# Patient Record
(demographics unavailable — no encounter records)

---

## 2025-01-17 NOTE — END OF VISIT
[FreeTextEntry3] : I personally saw and examined WILFREDO DIAZ in detail.I have made changes in changes in the body of the note where appropriate. I personally reviewed the HPI, PMH, FH, SH, ROS and medications/allergies. I have spoken to BUBBA Bruno regarding the history and have personally determined the assessment and plan of care and documented this myself.. I performed all procedures and performed the physical exam. I have made changes in the body of the note where appropriate.   Attesting Faculty: See Attending Signature Below

## 2025-01-17 NOTE — PROCEDURE
[FreeTextEntry3] : Procedure - Cerumen Removal. Indication - Obstructing visualization of TM After informed verbal consent is obtained, cerumen was removed from the right ear canal(s) with suction.  Normal appearing canal following removal. Left mastoid cavity debrided with suction, curette and alligator forceps.  Clean mastoid bowl following debridement.  Mastoid powder placed.

## 2025-01-17 NOTE — HISTORY OF PRESENT ILLNESS
[de-identified] : 68 y/o M with Hx of Lt. Tympanomastoidectomy and later tympanoplasty.  Now with ear itching and sensation of crusting.  Notes he pulled a large bloody crust from the ear.  No pain, no Vertigo.   Hx of FESS, no congestion.  Uses nasal saline.  Avoids medicated nasal sprays.

## 2025-01-17 NOTE — ASSESSMENT
[FreeTextEntry1] : Lt. Mastoid bowl with discomfort: - Mastoid cavity debrided - CSF powder placed - F/U 3 Months  Right cerumen: - Removed.

## 2025-01-17 NOTE — PHYSICAL EXAM
[Normal] : mucosa is normal [Midline] : trachea located in midline position [de-identified] : Left mastoid with minimal cursting, mod mucoid secretions.  Can see area in the mastoid cavity where he likely removed a crust.  Right with mild cerumen.

## 2025-03-12 NOTE — ASSESSMENT
[FreeTextEntry1] : R shoulder calcific tendonitis.  Increased pain with lifting injury on 3/10/25.   Now with AC joint sprain and cuff tendinitis.  Xrays reviewed.  Rest, ice, activity modification.  R SA injection 2023 with relief.  Tylenol (he cant take NSAIDs due to Eliquis). Will do R AC joint injection today.  Tolerated well.  May consider R SA CSI if pain persists.  Start PT.  RTO in 6 weeks.    L AC arthrosis, impingement, possible prox biceps rupture. L SA injection 9/15/22, good but temporary relief. MRI reviewed - full thickness supra tear, labral tearing, biceps tendonitis Discussed op versus non op tx, including the r/b/a/c of both. Discussed rehab and recovery after SA, SAD, acromioplasty, synovectomy, GHD, RCR. He had cardiac stents this year and unsure he will be cleared for surgery. Discussed risk of further tearing/atrophy.   Procedure Note: Large Joint Injection was performed because of pain and inflammation, failure of conservative treatment.   Medications: Depo-Medrol: 1 cc, 80 mg. Lidocaine: 1 cc, 1%.  Marcaine: 1 cc, .25%.   Medication was injected in the right acromioclavicular joint. Patient has tried OTC's including aspirin, Ibuprofen, Aleve etc or prescription NSAIDs, and/or exercises at home and/ or physical therapy without satisfactory response. The risks, benefits, and alternatives to cortisone injection were explained in full to the patient. Risks outlined include but are not limited to infection, sepsis, bleeding, scarring, skin discoloration, temporary increase in pain, syncopal episode, failure to resolve symptoms, allergic reaction, symptom recurrence, and elevation of blood sugar in diabetics. Patient understood the risks. All questions were answered. After discussion of options, patient requested an injection. Oral informed consent was obtained and sterile prep of the injection site was performed using alcohol. Sterile technique was utilized for the procedure including the preparation of the solutions used for the injection. Ethyl chloride spray was used topically.  Sterile technique used. Patient tolerated procedure well. Post Procedure Instructions: Patient was advised to call if redness, pain, or fever occur and apply ice for 15 min. out of every hour for the next 12-24 hours as tolerated. patient was advised to rest the joint(s) for 2 days.  Ultrasound Guidance was used for the following reasons: to visualize the AC joint. Ultrasound guided injection was performed of the shoulder, visualization of the needle and placement of injection was performed without complication.

## 2025-03-12 NOTE — IMAGING
[Right] : right shoulder [Calcific density] : Calcific density [There are no fractures, subluxations or dislocations. No significant abnormalities are seen] : There are no fractures, subluxations or dislocations. No significant abnormalities are seen [AC Joint Arthrosis] : AC Joint Arthrosis

## 2025-03-12 NOTE — HISTORY OF PRESENT ILLNESS
[Dull/Aching] : dull/aching [Tightness] : tightness [Frequent] : frequent [Leisure] : leisure [Rest] : rest [Meds] : meds [Coughing] : coughing [de-identified] : 3/12/25:  Follow up right shoulder.  Increased pain since 3/10/25 after lifting injury.  Pain mostly in the front and side of the shoulder.  pain worse with reaching.    4/18/23:  Today for a new evaluation of right shoulder pain and limited range of motion.  Pain is deep, worse with overhead.  11/8/22: Here to review MRI.  MRI left shoulder: 1. Tear of the superior labrum and inferior labrum. 8-mm septated anterior inferior labral cyst. 2. Biceps tendinopathy with fraying at the anchor and tenosynovitis. 3. Capsular thickening more noted anterior, which can be seen with adhesive capsulitis. 4. AC joint arthrosis with lateral acromial spur. Infraspinatus insertional tendinopathy and fraying. 14 x 11 mm full-thickness anterior insertional tear of the supraspinatus with proximal tendinopathy extending into the myotendinous junction and no muscle atrophy. 5. Mild arthrosis of the glenohumeral joint with joint effusion.  10/25/22: Here for follow up. He states injection helped for a week or two but pain returned. 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 There is pain with raising his arm.  9-15-22- He is a right hand dominant male who states he was pulling and lifting 3+ weeks ago when he felt a pop/sharp pain in the proximal/superior aspect of the left shoulder. initially he had limited range of motion which has improved.Continues with pain that wakes him at night. He has done ice and voltaren gel with some help   PMH cad (stents) on effient can not take nsaids DM  works as    [] : no [FreeTextEntry1] : Right Shoulder [FreeTextEntry3] : 03/09/25 [FreeTextEntry5] : Patient has been experiencing pain and tightness in the RIGHT SHOULDER for since 03/09/25. Patient was lifting heavy at work when they felt a strain similar to the pain they felt when they tore their left rotator cuff. Patient has been experiencing stiffness when waking up. [de-identified] : Lifting/

## 2025-03-12 NOTE — PHYSICAL EXAM
[Right] : right shoulder [5 ___] : forward flexion 5[unfilled]/5 [5___] : internal rotation 5[unfilled]/5 [] : no erythema [FreeTextEntry8] : AC joint >>  [FreeTextEntry9] :  ER 60

## 2025-03-26 NOTE — PHYSICAL EXAM
[Midline] : trachea located in midline position [de-identified] : Left mastoid bowl with large hard crust.  Right WNL [Normal] : no rashes

## 2025-03-26 NOTE — HISTORY OF PRESENT ILLNESS
[de-identified] : 66 y/o M s/p Left tympanomastoidectomy and later tympanoplasty.  Now having b/l tinnitus that has been increasing.  No longer bleeding.  No Vertigo.  Pos fullness.   Also with nasal congestion and frontal sinus pressure.  Using Flonase and Azelastine with minimal improvement.  Avoids PO antihistamine.

## 2025-03-26 NOTE — ASSESSMENT
[FreeTextEntry1] : S/p Left mastoidectomy and tympanoplasty, with Increasing tinnitus and hearing loss: - Left mastoid partially debrided - Start ofloxin gtts. Plan for further debridement in 10 days - Can consider new HA - H2O precautions.  - Plan for Audiogram at next visit   Nasal congestion, sinus pressure, ETD: - Continue with Flonase - Continue with Azelastine - Rx. Medrol dose pack - F/U in 10 days

## 2025-03-26 NOTE — PROCEDURE
[FreeTextEntry3] : Procedure - Left mastoid bowl debridement. Indication - Obstructing visualization of TM After informed verbal consent is obtained, crusting was removed from the left mastoid bowl with a curette and alligator forceps.

## 2025-04-07 NOTE — PHYSICAL EXAM
[Midline] : trachea located in midline position [de-identified] : Left mastoid bowl with large crusting and debris.  Right clear.  [Normal] : no rashes

## 2025-04-07 NOTE — PROCEDURE
[FreeTextEntry3] : Procedure - Mastoid Debridement.  Indication - Large crusting of mastoid bowl After informed verbal consent is obtained, crusting was removed from the left ear canal and mastoid bowl with an alligator forceps and suction.  Clean mastoid bowl following debridement. [Risk and Benefits Discussed] : The purpose, risks, discomforts, benefits and alternatives of the procedure have been explained to the patient including no treatment. [Same] : same as the Pre Op Dx. [] : Debridement of Mastoid [FreeTextEntry6] : debris removed from mastoid bowl-now clean used alligator-not suction

## 2025-04-07 NOTE — ASSESSMENT
[FreeTextEntry1] : Left mastoidectomy and tympanoplasty: - Debrided in office today - Continue ofloxan gtts for a few more days.  - F/U 3 months

## 2025-04-07 NOTE — HISTORY OF PRESENT ILLNESS
[de-identified] : 68 y/o M, s/p left mastoidectomy and tympanoplasty.  At last visit had increased tinnitus  Partially debrided, started on Medrol dose pack and ofloxin gtts.  Continues to use Flonase.  Now notes no nasal congestion.  Tinnitus is becoming more intermittent, however, still very intense.   Feels hearing is a bit decreased.

## 2025-04-29 NOTE — HISTORY OF PRESENT ILLNESS
[Dull/Aching] : dull/aching [Tightness] : tightness [Occasional] : occasional [Leisure] : leisure [Rest] : rest [Meds] : meds [Coughing] : coughing [de-identified] : 04/29/2025: Here for follow up Right shoulder. Pain has improved greatly since last visit. He has been doing HEP and CSI from 3/12/25 provided relief. His left shoulder is stable with HEP.   3/12/25:  Follow up right shoulder.  Increased pain since 3/10/25 after lifting injury.  Pain mostly in the front and side of the shoulder.  pain worse with reaching.    4/18/23:  Today for a new evaluation of right shoulder pain and limited range of motion.  Pain is deep, worse with overhead.  11/8/22: Here to review MRI.  MRI left shoulder: 1. Tear of the superior labrum and inferior labrum. 8-mm septated anterior inferior labral cyst. 2. Biceps tendinopathy with fraying at the anchor and tenosynovitis. 3. Capsular thickening more noted anterior, which can be seen with adhesive capsulitis. 4. AC joint arthrosis with lateral acromial spur. Infraspinatus insertional tendinopathy and fraying. 14 x 11 mm full-thickness anterior insertional tear of the supraspinatus with proximal tendinopathy extending into the myotendinous junction and no muscle atrophy. 5. Mild arthrosis of the glenohumeral joint with joint effusion.  10/25/22: Here for follow up. He states injection helped for a week or two but pain returned. 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 There is pain with raising his arm.  9-15-22- He is a right hand dominant male who states he was pulling and lifting 3+ weeks ago when he felt a pop/sharp pain in the proximal/superior aspect of the left shoulder. initially he had limited range of motion which has improved.Continues with pain that wakes him at night. He has done ice and voltaren gel with some help   PMH cad (stents) on effient can not take nsaids DM  works as    [] : no [FreeTextEntry1] : Right Shoulder [FreeTextEntry3] : 03/09/25 [FreeTextEntry5] : Patient has been experiencing pain and tightness in the RIGHT SHOULDER for since 03/09/25. Patient was lifting heavy at work when they felt a strain similar to the pain they felt when they tore their left rotator cuff. Patient has been experiencing stiffness when waking up. [de-identified] : Lifting/

## 2025-04-29 NOTE — ASSESSMENT
[FreeTextEntry1] : R shoulder calcific tendonitis.  Increased pain with lifting injury on 3/10/25.   Now with AC joint sprain and cuff tendinitis.  R SA injection 2023 with relief.  Tylenol (he cant take NSAIDs due to Eliquis). R AC joint injection 3/12/25, great relief. Continue HEP. RTO prn.   L AC arthrosis, impingement, possible prox biceps rupture. L SA injection 9/15/22, good but temporary relief. MRI reviewed - full thickness supra tear, labral tearing, biceps tendonitis Discussed op versus non op tx, including the r/b/a/c of both. Discussed rehab and recovery after SA, SAD, acromioplasty, synovectomy, GHD, RCR. He had cardiac stents this year and unsure he will be cleared for surgery. Discussed risk of further tearing/atrophy.

## 2025-04-30 NOTE — PHYSICAL EXAM
[de-identified] : retracted [de-identified] : retracted [de-identified] : no infection or cholesteatoma [de-identified] : no infection or cholesteatoma [de-identified] : clean [de-identified] : clean [de-identified] : wnl [de-identified] : congested [Midline] : trachea located in midline position [Normal] : no rashes

## 2025-04-30 NOTE — PHYSICAL EXAM
[de-identified] : retracted [de-identified] : retracted [de-identified] : no infection or cholesteatoma [de-identified] : no infection or cholesteatoma [de-identified] : clean [de-identified] : clean [de-identified] : wnl [de-identified] : congested [Midline] : trachea located in midline position [Normal] : no rashes

## 2025-04-30 NOTE — HISTORY OF PRESENT ILLNESS
[de-identified] : 68 y/o M, s/p left mastoidectomy and tympanoplasty.  At last visit had increased tinnitus  Partially debrided, started on Medrol dose pack and ofloxin gtts.  Continues to use Flonase.  Now notes no nasal congestion.  Tinnitus is becoming more intermittent, however, still very intense.   Feels hearing is a bit decreased.   [FreeTextEntry1] : 4/30/2025 still w/ tinnitus chr nasal congestion despite nasal spray and steroid tx

## 2025-04-30 NOTE — DATA REVIEWED
[de-identified] : Hearing Test performed to evaluate the extent of hearing loss and  to explain pt's symptoms  was personally reviewed and revealed Tymps-abn Hearing-small CH mostly SNHL bilat-stable level of hearing

## 2025-04-30 NOTE — DATA REVIEWED
[de-identified] : Hearing Test performed to evaluate the extent of hearing loss and  to explain pt's symptoms  was personally reviewed and revealed Tymps-abn Hearing-small CH mostly SNHL bilat-stable level of hearing